# Patient Record
Sex: FEMALE | Race: WHITE | NOT HISPANIC OR LATINO | Employment: FULL TIME | RURAL
[De-identification: names, ages, dates, MRNs, and addresses within clinical notes are randomized per-mention and may not be internally consistent; named-entity substitution may affect disease eponyms.]

---

## 2021-11-01 ENCOUNTER — LAB VISIT (OUTPATIENT)
Dept: PRIMARY CARE CLINIC | Facility: CLINIC | Age: 66
End: 2021-11-01

## 2021-11-01 DIAGNOSIS — Z02.1 DRUG SCREENING, PRE-EMPLOYMENT: Primary | ICD-10-CM

## 2021-11-01 PROCEDURE — 99000 SPECIMEN HANDLING OFFICE-LAB: CPT | Mod: ,,, | Performed by: NURSE PRACTITIONER

## 2021-11-01 PROCEDURE — 99000 PR URINE DRUG SCREEN COLLECTION: ICD-10-PCS | Mod: ,,, | Performed by: NURSE PRACTITIONER

## 2022-02-15 ENCOUNTER — LAB VISIT (OUTPATIENT)
Dept: PRIMARY CARE CLINIC | Facility: CLINIC | Age: 67
End: 2022-02-15

## 2022-02-15 DIAGNOSIS — Z02.83 ENCOUNTER FOR EMPLOYMENT-RELATED DRUG TESTING: ICD-10-CM

## 2022-02-15 PROCEDURE — 99000 SPECIMEN HANDLING OFFICE-LAB: CPT | Mod: ,,, | Performed by: NURSE PRACTITIONER

## 2022-02-15 PROCEDURE — 99000 PR SPECIMEN HANDLING,DR OFF->LAB: ICD-10-PCS | Mod: ,,, | Performed by: NURSE PRACTITIONER

## 2022-02-15 NOTE — PROGRESS NOTES
Subjective:       Patient ID: Breonna Hanley is a 66 y.o. female.    Chief Complaint: No chief complaint on file.    HPI  Review of Systems      Objective:      Physical Exam    Assessment:       Problem List Items Addressed This Visit    None     Visit Diagnoses     Encounter for employment-related drug testing              Plan:       Drug testing only

## 2022-03-22 ENCOUNTER — LAB VISIT (OUTPATIENT)
Dept: PRIMARY CARE CLINIC | Facility: CLINIC | Age: 67
End: 2022-03-22

## 2022-03-22 DIAGNOSIS — Z02.83 ENCOUNTER FOR EMPLOYMENT-RELATED DRUG TESTING: ICD-10-CM

## 2022-03-22 PROCEDURE — 99000 SPECIMEN HANDLING OFFICE-LAB: CPT | Mod: ,,, | Performed by: NURSE PRACTITIONER

## 2022-03-22 PROCEDURE — 99000 PR URINE DRUG SCREEN COLLECTION: ICD-10-PCS | Mod: ,,, | Performed by: NURSE PRACTITIONER

## 2022-03-22 NOTE — PROGRESS NOTES
Subjective:       Patient ID: Breonna Hanley is a 67 y.o. female.    Chief Complaint: No chief complaint on file.    HPI  Review of Systems      Objective:      Physical Exam    Assessment:       Problem List Items Addressed This Visit    None     Visit Diagnoses     Encounter for employment-related drug testing              Plan:       Drug testing only

## 2023-02-07 ENCOUNTER — LAB VISIT (OUTPATIENT)
Dept: PRIMARY CARE CLINIC | Facility: CLINIC | Age: 68
End: 2023-02-07
Payer: COMMERCIAL

## 2023-02-07 DIAGNOSIS — Z02.83 ENCOUNTER FOR DRUG SCREENING: Primary | ICD-10-CM

## 2023-02-07 PROCEDURE — 99000 PR URINE DRUG SCREEN COLLECTION: ICD-10-PCS | Mod: ,,, | Performed by: NURSE PRACTITIONER

## 2023-02-07 PROCEDURE — 99000 SPECIMEN HANDLING OFFICE-LAB: CPT | Mod: ,,, | Performed by: NURSE PRACTITIONER

## 2023-02-07 NOTE — PROGRESS NOTES
Subjective:       Patient ID: Breonna Hanley is a 67 y.o. female.    Chief Complaint: No chief complaint on file.    HPI  Review of Systems      Objective:      Physical Exam    Assessment:       Problem List Items Addressed This Visit    None  Visit Diagnoses       Encounter for drug screening    -  Primary            Plan:       See scanned documents in .

## 2023-02-28 ENCOUNTER — OFFICE VISIT (OUTPATIENT)
Dept: OBSTETRICS AND GYNECOLOGY | Facility: CLINIC | Age: 68
End: 2023-02-28
Payer: COMMERCIAL

## 2023-02-28 VITALS
DIASTOLIC BLOOD PRESSURE: 105 MMHG | RESPIRATION RATE: 16 BRPM | HEIGHT: 63 IN | BODY MASS INDEX: 32.89 KG/M2 | HEART RATE: 61 BPM | TEMPERATURE: 98 F | WEIGHT: 185.63 LBS | SYSTOLIC BLOOD PRESSURE: 174 MMHG

## 2023-02-28 DIAGNOSIS — E89.40 ASYMPTOMATIC POSTSURGICAL MENOPAUSE: ICD-10-CM

## 2023-02-28 DIAGNOSIS — N89.8 VAGINAL DISCHARGE: ICD-10-CM

## 2023-02-28 DIAGNOSIS — B37.31 YEAST VAGINITIS: ICD-10-CM

## 2023-02-28 DIAGNOSIS — N95.2 VAGINITIS, ATROPHIC: ICD-10-CM

## 2023-02-28 DIAGNOSIS — B96.89 BACTERIAL VAGINOSIS: ICD-10-CM

## 2023-02-28 DIAGNOSIS — Z01.419 ENCOUNTER FOR ANNUAL ROUTINE GYNECOLOGICAL EXAMINATION: Primary | ICD-10-CM

## 2023-02-28 DIAGNOSIS — N76.0 BACTERIAL VAGINOSIS: ICD-10-CM

## 2023-02-28 DIAGNOSIS — I10 HYPERTENSION, UNSPECIFIED TYPE: ICD-10-CM

## 2023-02-28 DIAGNOSIS — E55.9 VITAMIN D DEFICIENCY: ICD-10-CM

## 2023-02-28 PROCEDURE — 3008F BODY MASS INDEX DOCD: CPT | Mod: ,,, | Performed by: OBSTETRICS & GYNECOLOGY

## 2023-02-28 PROCEDURE — 3080F DIAST BP >= 90 MM HG: CPT | Mod: ,,, | Performed by: OBSTETRICS & GYNECOLOGY

## 2023-02-28 PROCEDURE — 3077F PR MOST RECENT SYSTOLIC BLOOD PRESSURE >= 140 MM HG: ICD-10-PCS | Mod: ,,, | Performed by: OBSTETRICS & GYNECOLOGY

## 2023-02-28 PROCEDURE — 1159F MED LIST DOCD IN RCRD: CPT | Mod: ,,, | Performed by: OBSTETRICS & GYNECOLOGY

## 2023-02-28 PROCEDURE — 88142 CYTOPATH C/V THIN LAYER: CPT | Mod: TC,GCY | Performed by: OBSTETRICS & GYNECOLOGY

## 2023-02-28 PROCEDURE — 87660 TRICHOMONAS VAGIN DIR PROBE: CPT | Mod: ,,, | Performed by: CLINICAL MEDICAL LABORATORY

## 2023-02-28 PROCEDURE — 1160F PR REVIEW ALL MEDS BY PRESCRIBER/CLIN PHARMACIST DOCUMENTED: ICD-10-PCS | Mod: ,,, | Performed by: OBSTETRICS & GYNECOLOGY

## 2023-02-28 PROCEDURE — 87480 CANDIDA DNA DIR PROBE: CPT | Mod: ,,, | Performed by: CLINICAL MEDICAL LABORATORY

## 2023-02-28 PROCEDURE — 81001 URINALYSIS: ICD-10-PCS | Mod: ,,, | Performed by: CLINICAL MEDICAL LABORATORY

## 2023-02-28 PROCEDURE — 99387 PR PREVENTIVE VISIT,NEW,65 & OVER: ICD-10-PCS | Mod: S$PBB,,, | Performed by: OBSTETRICS & GYNECOLOGY

## 2023-02-28 PROCEDURE — 87491 CHLMYD TRACH DNA AMP PROBE: CPT | Mod: ,,, | Performed by: CLINICAL MEDICAL LABORATORY

## 2023-02-28 PROCEDURE — 87491 CHLAMYDIA/GC, PCR (THINPREP): ICD-10-PCS | Mod: ,,, | Performed by: CLINICAL MEDICAL LABORATORY

## 2023-02-28 PROCEDURE — 87591 CHLAMYDIA/GC, PCR (THINPREP): ICD-10-PCS | Mod: ,,, | Performed by: CLINICAL MEDICAL LABORATORY

## 2023-02-28 PROCEDURE — 3008F PR BODY MASS INDEX (BMI) DOCUMENTED: ICD-10-PCS | Mod: ,,, | Performed by: OBSTETRICS & GYNECOLOGY

## 2023-02-28 PROCEDURE — 1160F RVW MEDS BY RX/DR IN RCRD: CPT | Mod: ,,, | Performed by: OBSTETRICS & GYNECOLOGY

## 2023-02-28 PROCEDURE — 3077F SYST BP >= 140 MM HG: CPT | Mod: ,,, | Performed by: OBSTETRICS & GYNECOLOGY

## 2023-02-28 PROCEDURE — 99387 INIT PM E/M NEW PAT 65+ YRS: CPT | Mod: S$PBB,,, | Performed by: OBSTETRICS & GYNECOLOGY

## 2023-02-28 PROCEDURE — 99214 OFFICE O/P EST MOD 30 MIN: CPT | Mod: PBBFAC | Performed by: OBSTETRICS & GYNECOLOGY

## 2023-02-28 PROCEDURE — 87624 HUMAN PAPILLOMAVIRUS (HPV): ICD-10-PCS | Mod: ,,, | Performed by: CLINICAL MEDICAL LABORATORY

## 2023-02-28 PROCEDURE — 87510 GARDNER VAG DNA DIR PROBE: CPT | Mod: ,,, | Performed by: CLINICAL MEDICAL LABORATORY

## 2023-02-28 PROCEDURE — 81001 URINALYSIS AUTO W/SCOPE: CPT | Mod: ,,, | Performed by: CLINICAL MEDICAL LABORATORY

## 2023-02-28 PROCEDURE — 87510 BACTERIAL VAGINOSIS: ICD-10-PCS | Mod: ,,, | Performed by: CLINICAL MEDICAL LABORATORY

## 2023-02-28 PROCEDURE — 87660 BACTERIAL VAGINOSIS: ICD-10-PCS | Mod: ,,, | Performed by: CLINICAL MEDICAL LABORATORY

## 2023-02-28 PROCEDURE — 3080F PR MOST RECENT DIASTOLIC BLOOD PRESSURE >= 90 MM HG: ICD-10-PCS | Mod: ,,, | Performed by: OBSTETRICS & GYNECOLOGY

## 2023-02-28 PROCEDURE — 87591 N.GONORRHOEAE DNA AMP PROB: CPT | Mod: ,,, | Performed by: CLINICAL MEDICAL LABORATORY

## 2023-02-28 PROCEDURE — 1159F PR MEDICATION LIST DOCUMENTED IN MEDICAL RECORD: ICD-10-PCS | Mod: ,,, | Performed by: OBSTETRICS & GYNECOLOGY

## 2023-02-28 PROCEDURE — 87624 HPV HI-RISK TYP POOLED RSLT: CPT | Mod: ,,, | Performed by: CLINICAL MEDICAL LABORATORY

## 2023-02-28 PROCEDURE — 87480 BACTERIAL VAGINOSIS: ICD-10-PCS | Mod: ,,, | Performed by: CLINICAL MEDICAL LABORATORY

## 2023-02-28 RX ORDER — FUROSEMIDE 20 MG/1
20-40 TABLET ORAL DAILY PRN
COMMUNITY
Start: 2023-01-30

## 2023-02-28 RX ORDER — PANTOPRAZOLE SODIUM 40 MG/1
40 TABLET, DELAYED RELEASE ORAL
COMMUNITY
Start: 2023-01-26

## 2023-02-28 RX ORDER — CLINDAMYCIN PHOSPHATE 10 MG/G
GEL TOPICAL
COMMUNITY
Start: 2023-01-13 | End: 2024-03-20

## 2023-02-28 RX ORDER — PSEUDOEPHEDRINE HCL 30 MG/1
30 TABLET, FILM COATED ORAL
COMMUNITY
Start: 2023-01-12 | End: 2024-03-20

## 2023-02-28 RX ORDER — SPIRONOLACTONE 50 MG/1
50 TABLET, FILM COATED ORAL 2 TIMES DAILY
COMMUNITY
Start: 2022-12-01

## 2023-02-28 RX ORDER — ESTRADIOL 1 MG/1
1 TABLET ORAL
COMMUNITY
Start: 2023-02-21 | End: 2023-02-28 | Stop reason: SDUPTHER

## 2023-02-28 RX ORDER — TRAMADOL HYDROCHLORIDE 50 MG/1
50-100 TABLET ORAL EVERY 6 HOURS PRN
COMMUNITY
Start: 2023-01-25 | End: 2024-03-20

## 2023-02-28 RX ORDER — METOPROLOL SUCCINATE 50 MG/1
50 TABLET, EXTENDED RELEASE ORAL
COMMUNITY
Start: 2023-01-26

## 2023-02-28 RX ORDER — FLUCONAZOLE 150 MG/1
TABLET ORAL
COMMUNITY
Start: 2023-02-07 | End: 2023-03-30

## 2023-02-28 RX ORDER — ESTRADIOL 1 MG/1
1 TABLET ORAL DAILY
Qty: 90 TABLET | Refills: 3 | Status: SHIPPED | OUTPATIENT
Start: 2023-02-28 | End: 2024-02-28

## 2023-02-28 NOTE — PATIENT INSTRUCTIONS
Dr. Jeff Loja thanks you for this annual exam visit at [facility].    Please remember to perform monthly breast self exams. If you are over 40 years of age, Dr. Loja recommends yearly mammograms. Please check with your insurance carrier for mammogram insurance coverage.    Colonoscopy indication:      Low risk family history: schedule after age 50 for initial evaluation by a GI specialist.    High risk family history: Schedule before age 50 for initial evaluation by a GI specialist. High risk family history includes history of colon cancer in an offspring, brother or sister or parent.    The Annual Exam or periodic exam may includes thin prep Pap smear and appropriate ancillary labs as allowed by your insurance coverage.The studies Dr. Jeff Loja ordered has been checked by our [employer] Electronic Medical Record software today.     Please use perscribed medications as directed.    Special considerations after this visit:  Screening labs; thin prep Pap; hemoccult screen was negative today; affirm study; renew estradiol; recommend monthly breast self-exam and yearly mammography; recommend bone density; recommend interval colonoscopy      Dr. Jeff Loja recommends vitamin D3 4000 units daily.      Please practice best food and exercise habits for your age.    Dr. Jeff Loja recommends avoidance of smoking and illicit medications or habits.    Please call or schedule for any change in your health.     Dr. Jeff Loja recommends yearly exams for good health maintenance even if you pap smear schedule (as allowed by your health insurance coverage)  does not allow yearly pap testing.    Dr. Jeff Loja    02/28/2023 4:49 PM

## 2023-02-28 NOTE — PROGRESS NOTES
"Beronna Hanley female  for   Chief Complaint   Patient presents with    Gynecologic Exam    Medication Refill     Estrace    Annual Exam      OB History          4    Para   2    Term                AB   2    Living             SAB        IAB        Ectopic        Multiple        Live Births                      PHI:  68-year-old  female presents for an annual exam.  She is had in the past in  in [place] a LAVH and BSO for uterine prolapse.  Patient is on estradiol hormone replacement therapy.  Her previous gynecologist was Dr. Cruz who has left gynecologic practice for hospitalist care.    She recently was treated for facial acne with the presumed tetracycline and she states she developed the yeast vaginitis.  She still is slightly symptomatic.  She was treated by her other physician who treated the acne with a presumed Diflucan therapy.    History reviewed. No pertinent past medical history.   Past Surgical History:   Procedure Laterality Date    HYSTERECTOMY, VAGINAL, LAPAROSCOPY-ASSISTED, WITH SALPINGO-OOPHORECTOY      TUBAL LIGATION        Review of patient's allergies indicates:   Allergen Reactions    Azithromycin Edema    Celecoxib Other (See Comments)     Facial dropping, mimicking of stroke symptoms    Penicillins Edema        ROS:Pertinent items are noted in HPI.    Physical exam:    BP (!) 174/105   Pulse 61   Temp 97.9 °F (36.6 °C)   Resp 16   Ht 5' 3" (1.6 m)   Wt 84.2 kg (185 lb 9.6 oz)   BMI 32.88 kg/m²      General Appearance: healthy, alert, no distress, smiling    HEENT: normal exam    Lymphatic: no palpable lymphadenopathy, no hepatosplenomegaly    Chest:         Breasts:No dimpling, nipple retraction or discharge. No masses or nodes., Normal to inspection, and Normal breast tissue bilaterally         Lungs: clear to auscultation bilaterally         Heart: regular rate and rhythm, S1, S2 normal, no murmur, click, rub or gallop    Abdomen:soft, non-tender, " without masses or organomegaly, normal bowel sounds, without guarding, without rebound, and moderately obese; hypogastric midline incision well-healed with no ventral incisional hernia; no abdominal bruit; no ascites    Pelvic:             Vulva:normal, normal female genitalia            Vagina:atrophic, well-supported vaginal vault with a slight malodor noted on speculum exam            Uterus: surgically absent uterus and cervix            Adnexae: surgically absent    Rectal:negative, stool guaiac negative    Extremity: normal, extremities warm, no clubbing, no cyanosis, no edema, non-tender; no CVA tenderness bilaterally; good back alignment with exaggerated anterior lordosis     Skin: normal exam        Assessment:   Problem List Items Addressed This Visit    None  Visit Diagnoses       Encounter for annual routine gynecological examination    -  Primary    Relevant Orders    Bacterial Vaginosis (Completed)    ThinPrep Pap Test    CBC Auto Differential (Completed)    Comprehensive Metabolic Panel (Completed)    Lipid Panel (Completed)    Vitamin D (Completed)    Urinalysis (Completed)    Urinalysis, Microscopic (Completed)    Asymptomatic postsurgical menopause        Relevant Orders    Bacterial Vaginosis (Completed)    ThinPrep Pap Test    CBC Auto Differential (Completed)    Comprehensive Metabolic Panel (Completed)    Lipid Panel (Completed)    Vitamin D (Completed)    Urinalysis (Completed)    Urinalysis, Microscopic (Completed)    Vaginal discharge        Malodor -suspect BV    Relevant Orders    Bacterial Vaginosis (Completed)    ThinPrep Pap Test    CBC Auto Differential (Completed)    Comprehensive Metabolic Panel (Completed)    Lipid Panel (Completed)    Vitamin D (Completed)    Urinalysis (Completed)    Urinalysis, Microscopic (Completed)    Vaginitis, atrophic        Relevant Orders    Bacterial Vaginosis (Completed)    ThinPrep Pap Test    CBC Auto Differential (Completed)    Comprehensive  Metabolic Panel (Completed)    Lipid Panel (Completed)    Vitamin D (Completed)    Urinalysis (Completed)    Urinalysis, Microscopic (Completed)    Vitamin D deficiency        Vitamin D 25-Hydroxy, Blood ng/mL 23.3 -slightly low        Relevant Orders    Bacterial Vaginosis (Completed)    ThinPrep Pap Test    CBC Auto Differential (Completed)    Comprehensive Metabolic Panel (Completed)    Lipid Panel (Completed)    Vitamin D (Completed)    Urinalysis (Completed)    Urinalysis, Microscopic (Completed)    Hypertension, unspecified type        Relevant Orders    Bacterial Vaginosis (Completed)    ThinPrep Pap Test    CBC Auto Differential (Completed)    Comprehensive Metabolic Panel (Completed)    Lipid Panel (Completed)    Vitamin D (Completed)    Urinalysis (Completed)    Urinalysis, Microscopic (Completed)    Bacterial vaginosis        Confirmed by affirm study yesterday    Yeast vaginitis        Confirmed by affirm study yesterday             Plan:  Screening labs; vitamin-D checked today; thin prep Pap; hemoccult screen was negative today; affirm study; renew estradiol; recommend monthly breast self-exam and yearly mammography; recommend bone density; recommend interval colonoscopy    Dr. Jeff Loja recommends vitamin D3 4000 units daily.    Addendum on 03/01/2023:  Vitamin-D level is 24 ng/mL.  This is slightly low.  At her visit yesterday she was advised use over-the-counter vitamin D3 at 4000 units daily.  Dr. Jeff Loja recommends checking vitamin-D total in approximately 4 months.    Addendum on 03/01/2023:    Affirm study is positive for both yeast as well as bacterial vaginosis.  The patient be sent to prescriptions-1 prescription for Flagyl as well as Diflucan therapy as the other prescription to treat the 2 conditions of vulvovaginitis.

## 2023-03-01 ENCOUNTER — PATIENT MESSAGE (OUTPATIENT)
Dept: OBSTETRICS AND GYNECOLOGY | Facility: CLINIC | Age: 68
End: 2023-03-01
Payer: COMMERCIAL

## 2023-03-01 LAB
BILIRUB UR QL STRIP: NEGATIVE
CANDIDA SPECIES: POSITIVE
CLARITY UR: CLEAR
COLOR UR: COLORLESS
GARDNERELLA: POSITIVE
GLUCOSE UR STRIP-MCNC: NORMAL MG/DL
KETONES UR STRIP-SCNC: NORMAL MG/DL
LEUKOCYTE ESTERASE UR QL STRIP: NEGATIVE
NITRITE UR QL STRIP: NEGATIVE
PH UR STRIP: 5 PH UNITS
PROT UR QL STRIP: NEGATIVE
RBC # UR STRIP: NEGATIVE /UL
RBC #/AREA URNS HPF: <1 /HPF
SP GR UR STRIP: 1.01
SQUAMOUS #/AREA URNS LPF: ABNORMAL /HPF
TRICHOMONAS: NEGATIVE
UROBILINOGEN UR STRIP-ACNC: NORMAL MG/DL
WBC #/AREA URNS HPF: 1 /HPF

## 2023-03-01 RX ORDER — METRONIDAZOLE 500 MG/1
500 TABLET ORAL 3 TIMES DAILY
Qty: 42 TABLET | Refills: 0 | Status: SHIPPED | OUTPATIENT
Start: 2023-03-01 | End: 2023-03-30 | Stop reason: SDUPTHER

## 2023-03-01 RX ORDER — FLUCONAZOLE 100 MG/1
100 TABLET ORAL DAILY
Qty: 10 TABLET | Refills: 0 | Status: SHIPPED | OUTPATIENT
Start: 2023-03-01 | End: 2023-03-11

## 2023-03-03 LAB
GH SERPL-MCNC: NORMAL NG/ML
INSULIN SERPL-ACNC: NORMAL U[IU]/ML
LAB AP CLINICAL INFORMATION: NORMAL
LAB AP GYN INTERPRETATION: NEGATIVE
LAB AP PAP DISCLAIMER COMMENTS: NORMAL
RENIN PLAS-CCNC: NORMAL NG/ML/H

## 2023-03-07 LAB
CHLAMYDIA BY PCR: NEGATIVE
HPV 16: NEGATIVE
HPV 18: NEGATIVE
HPV OTHER: NEGATIVE
N. GONORRHOEAE (GC) BY PCR: NEGATIVE

## 2023-03-13 ENCOUNTER — TELEPHONE (OUTPATIENT)
Dept: OBSTETRICS AND GYNECOLOGY | Facility: CLINIC | Age: 68
End: 2023-03-13
Payer: COMMERCIAL

## 2023-03-13 NOTE — TELEPHONE ENCOUNTER
Message from Jeff Loja MD   Call patient    Advise her that she has both yeast vaginitis and bacterial vaginosis and a prescription for both Flagyl and Diflucan was sent to her pharmacy at [facility] today.  Date of prescription was 03/01/2023.    Pt verified by name and d.o.b, clarified if pt received med sent to pharmacy via e-script, voiced yes. Return for test cure appt made.

## 2023-03-29 ENCOUNTER — OFFICE VISIT (OUTPATIENT)
Dept: OBSTETRICS AND GYNECOLOGY | Facility: CLINIC | Age: 68
End: 2023-03-29
Payer: COMMERCIAL

## 2023-03-29 VITALS
TEMPERATURE: 98 F | RESPIRATION RATE: 16 BRPM | SYSTOLIC BLOOD PRESSURE: 163 MMHG | DIASTOLIC BLOOD PRESSURE: 97 MMHG | HEART RATE: 71 BPM | HEIGHT: 63 IN | BODY MASS INDEX: 32.07 KG/M2 | WEIGHT: 181 LBS

## 2023-03-29 DIAGNOSIS — N76.0 VAGINITIS AND VULVOVAGINITIS: Primary | ICD-10-CM

## 2023-03-29 DIAGNOSIS — Z12.31 SCREENING MAMMOGRAM, ENCOUNTER FOR: ICD-10-CM

## 2023-03-29 DIAGNOSIS — N95.2 VAGINITIS, ATROPHIC: ICD-10-CM

## 2023-03-29 LAB
CANDIDA SPECIES: POSITIVE
GARDNERELLA: POSITIVE
TRICHOMONAS: NEGATIVE

## 2023-03-29 PROCEDURE — 87510 GARDNER VAG DNA DIR PROBE: CPT | Mod: ,,, | Performed by: CLINICAL MEDICAL LABORATORY

## 2023-03-29 PROCEDURE — 1159F MED LIST DOCD IN RCRD: CPT | Mod: ,,, | Performed by: OBSTETRICS & GYNECOLOGY

## 2023-03-29 PROCEDURE — 87480 BACTERIAL VAGINOSIS: ICD-10-PCS | Mod: ,,, | Performed by: CLINICAL MEDICAL LABORATORY

## 2023-03-29 PROCEDURE — 99214 OFFICE O/P EST MOD 30 MIN: CPT | Mod: S$PBB,,, | Performed by: OBSTETRICS & GYNECOLOGY

## 2023-03-29 PROCEDURE — 1159F PR MEDICATION LIST DOCUMENTED IN MEDICAL RECORD: ICD-10-PCS | Mod: ,,, | Performed by: OBSTETRICS & GYNECOLOGY

## 2023-03-29 PROCEDURE — 3080F DIAST BP >= 90 MM HG: CPT | Mod: ,,, | Performed by: OBSTETRICS & GYNECOLOGY

## 2023-03-29 PROCEDURE — 3080F PR MOST RECENT DIASTOLIC BLOOD PRESSURE >= 90 MM HG: ICD-10-PCS | Mod: ,,, | Performed by: OBSTETRICS & GYNECOLOGY

## 2023-03-29 PROCEDURE — 99214 PR OFFICE/OUTPT VISIT, EST, LEVL IV, 30-39 MIN: ICD-10-PCS | Mod: S$PBB,,, | Performed by: OBSTETRICS & GYNECOLOGY

## 2023-03-29 PROCEDURE — 87510 BACTERIAL VAGINOSIS: ICD-10-PCS | Mod: ,,, | Performed by: CLINICAL MEDICAL LABORATORY

## 2023-03-29 PROCEDURE — 87660 BACTERIAL VAGINOSIS: ICD-10-PCS | Mod: ,,, | Performed by: CLINICAL MEDICAL LABORATORY

## 2023-03-29 PROCEDURE — 99214 OFFICE O/P EST MOD 30 MIN: CPT | Mod: PBBFAC | Performed by: OBSTETRICS & GYNECOLOGY

## 2023-03-29 PROCEDURE — 3077F SYST BP >= 140 MM HG: CPT | Mod: ,,, | Performed by: OBSTETRICS & GYNECOLOGY

## 2023-03-29 PROCEDURE — 3008F PR BODY MASS INDEX (BMI) DOCUMENTED: ICD-10-PCS | Mod: ,,, | Performed by: OBSTETRICS & GYNECOLOGY

## 2023-03-29 PROCEDURE — 3077F PR MOST RECENT SYSTOLIC BLOOD PRESSURE >= 140 MM HG: ICD-10-PCS | Mod: ,,, | Performed by: OBSTETRICS & GYNECOLOGY

## 2023-03-29 PROCEDURE — 87480 CANDIDA DNA DIR PROBE: CPT | Mod: ,,, | Performed by: CLINICAL MEDICAL LABORATORY

## 2023-03-29 PROCEDURE — 87660 TRICHOMONAS VAGIN DIR PROBE: CPT | Mod: ,,, | Performed by: CLINICAL MEDICAL LABORATORY

## 2023-03-29 PROCEDURE — 3008F BODY MASS INDEX DOCD: CPT | Mod: ,,, | Performed by: OBSTETRICS & GYNECOLOGY

## 2023-03-29 RX ORDER — ESTRADIOL 0.1 MG/G
1 CREAM VAGINAL
Qty: 42.5 G | Refills: 1 | Status: SHIPPED | OUTPATIENT
Start: 2023-03-29 | End: 2024-03-20 | Stop reason: SDUPTHER

## 2023-03-29 NOTE — PATIENT INSTRUCTIONS
Dr. Jeff Loja thanks you for this office visit at [facility].    Diagnosis for this visit:   Problem List Items Addressed This Visit    None  Visit Diagnoses       Vaginitis and vulvovaginitis    -  Primary    Resolved due to yeast and BV clinically    Relevant Orders    Bacterial Vaginosis    Vaginitis, atrophic        Relevant Orders    Bacterial Vaginosis             The Plan:  Repeat affirm; obtain records from [facility] regards to DEXA scan; recommend initiation of estradiol vaginal cream once weekly for atrophic vaginitis; Dr. Jeff Loja will recommend a mammogram at the [facility].        Please practice best food and exercise habits for your age.    Dr. Jeff Loja recommends avoidance of smoking and illicit medications or habits.    Please call  or schedule for any change in your health.     Please keep the next scheduled appointment or call for any need to change the appointment.     Dr. Jeff Loja recommends yearly exams for good health maintenance.      Thank you    Dr. Jeff Loja  03/29/2023 11:48 AM

## 2023-03-29 NOTE — PROGRESS NOTES
"Breonna Hanley female  for   Chief Complaint   Patient presents with    Follow-up     Repeat affirm for BV and yeast and lab results          PHI:  68-year-old  4, para 2, AB2  female who is had an LAVH and BSO for uterine prolapse in the past returns for follow-up.  The patient was found have both yeast and bacterial vaginosis and she is been treated with Diflucan and also with Flagyl.  She was asymptomatic on initiation of therapy.  Subsequent she is had some reoccurrence slight irritation since completion of therapy.     Her labs were reviewed and felt to be within normal limits.  DEXA scan was performed at [facility] last week the results are not on the chart yet    History reviewed. No pertinent past medical history.   Past Surgical History:   Procedure Laterality Date    HYSTERECTOMY, VAGINAL, LAPAROSCOPY-ASSISTED, WITH SALPINGO-OOPHORECTOY      TUBAL LIGATION        Review of patient's allergies indicates:   Allergen Reactions    Azithromycin Edema    Celecoxib Other (See Comments)     Facial dropping, mimicking of stroke symptoms    Penicillins Edema        ROS:Pertinent items are noted in HPI.    Physical exam:    BP (!) 163/97   Pulse 71   Temp 97.8 °F (36.6 °C)   Resp 16   Ht 5' 3" (1.6 m)   Wt 82.1 kg (181 lb)   BMI 32.06 kg/m²      General Appearance: healthy, alert, no distress, smiling    Chest:           Lungs: clear to auscultation bilaterally           Heart: regular rate and rhythm, S1, S2 normal, no murmur, click, rub or gallop    Abdomen:  Soft and nontender  Pelvic:  Vulva unremarkable; speculum exam reveals well-supported vaginal vault with atrophy and no white discharge or odor.  Bladder base is nontender.      Extremity: normal    Skin: normal exam        Assessment:   Problem List Items Addressed This Visit    None  Visit Diagnoses       Vaginitis and vulvovaginitis    -  Primary    Resolved due to yeast and BV clinically    Vaginitis, " atrophic                 Plan:  Repeat affirm; obtain records from [facility] regards to DEXA scan; recommend initiation of estradiol vaginal cream once weekly for atrophic vaginitis; Dr. Jeff Loja will recommend a mammogram at the [facility].    Addendum on 03/30/2023:  Patient was found to have positive still for yeast and BV on affirm study.  This may be consistent with her complaint of a slight recurrence of vaginal irritation although the exam yesterday revealed no odor or obvious discharge.  In view of this findings the patient should be treated again with both Diflucan and Flagyl.  Her partner should be treated with Flagyl as well.  Her lab review indicates her chemistries are normal with a normal glucose.

## 2023-03-30 RX ORDER — FLUCONAZOLE 100 MG/1
100 TABLET ORAL DAILY
Qty: 30 TABLET | Refills: 2 | Status: SHIPPED | OUTPATIENT
Start: 2023-03-30 | End: 2023-04-09

## 2023-03-30 RX ORDER — METRONIDAZOLE 500 MG/1
500 TABLET ORAL 3 TIMES DAILY
Qty: 84 TABLET | Refills: 0 | Status: SHIPPED | OUTPATIENT
Start: 2023-03-30 | End: 2023-04-13

## 2023-04-04 ENCOUNTER — TELEPHONE (OUTPATIENT)
Dept: OBSTETRICS AND GYNECOLOGY | Facility: CLINIC | Age: 68
End: 2023-04-04
Payer: COMMERCIAL

## 2023-04-04 NOTE — TELEPHONE ENCOUNTER
Message from Jeff Loja MD     Call patient advise her her affirm study is again positive for yeast in bacterial vaginosis.  Dr. Jeff Loja sent a renewal prescription for Flagyl for both her and her sexual partner.  In addition she does need to have another course of Diflucan.  Her blood sugars were checked and she is not diabetic.    Pt verified by name and  Reviewed lab results and recommended treatment for both her and her partner. Clarification of all medications prescribed. Voiced understanding and agreement. Mrs. Hanley will contact our office once treatment has been completed.

## 2023-04-06 ENCOUNTER — PATIENT MESSAGE (OUTPATIENT)
Dept: PRIMARY CARE CLINIC | Facility: CLINIC | Age: 68
End: 2023-04-06
Payer: COMMERCIAL

## 2023-05-04 ENCOUNTER — PATIENT MESSAGE (OUTPATIENT)
Dept: PRIMARY CARE CLINIC | Facility: CLINIC | Age: 68
End: 2023-05-04
Payer: COMMERCIAL

## 2023-05-16 ENCOUNTER — HOSPITAL ENCOUNTER (OUTPATIENT)
Dept: RADIOLOGY | Facility: HOSPITAL | Age: 68
Discharge: HOME | End: 2023-05-16
Attending: OBSTETRICS & GYNECOLOGY
Payer: COMMERCIAL

## 2023-05-16 VITALS — HEIGHT: 63 IN | BODY MASS INDEX: 32.07 KG/M2 | WEIGHT: 181 LBS

## 2023-05-16 DIAGNOSIS — Z12.31 SCREENING MAMMOGRAM, ENCOUNTER FOR: ICD-10-CM

## 2023-05-16 PROCEDURE — 77067 SCR MAMMO BI INCL CAD: CPT | Mod: TC

## 2024-03-20 ENCOUNTER — OFFICE VISIT (OUTPATIENT)
Dept: OBSTETRICS AND GYNECOLOGY | Facility: CLINIC | Age: 69
End: 2024-03-20
Payer: COMMERCIAL

## 2024-03-20 VITALS
DIASTOLIC BLOOD PRESSURE: 83 MMHG | BODY MASS INDEX: 31.18 KG/M2 | WEIGHT: 176 LBS | SYSTOLIC BLOOD PRESSURE: 152 MMHG | HEART RATE: 69 BPM | HEIGHT: 63 IN

## 2024-03-20 DIAGNOSIS — N84.2 POLYP OF VAGINA: ICD-10-CM

## 2024-03-20 DIAGNOSIS — E89.40 ASYMPTOMATIC POSTSURGICAL MENOPAUSE: ICD-10-CM

## 2024-03-20 DIAGNOSIS — E55.9 VITAMIN D DEFICIENCY: ICD-10-CM

## 2024-03-20 DIAGNOSIS — K21.9 GASTROESOPHAGEAL REFLUX DISEASE, UNSPECIFIED WHETHER ESOPHAGITIS PRESENT: ICD-10-CM

## 2024-03-20 DIAGNOSIS — N95.2 VAGINITIS, ATROPHIC: Primary | ICD-10-CM

## 2024-03-20 DIAGNOSIS — Z01.419 ENCOUNTER FOR ANNUAL ROUTINE GYNECOLOGICAL EXAMINATION: ICD-10-CM

## 2024-03-20 DIAGNOSIS — I10 HYPERTENSION, UNSPECIFIED TYPE: ICD-10-CM

## 2024-03-20 LAB
BILIRUB UR QL STRIP: NEGATIVE
CLARITY UR: CLEAR
COLOR UR: COLORLESS
CTP QC/QA: YES
FECAL OCCULT BLOOD, POC: NEGATIVE
GLUCOSE UR STRIP-MCNC: NORMAL MG/DL
KETONES UR STRIP-SCNC: NEGATIVE MG/DL
LEUKOCYTE ESTERASE UR QL STRIP: NEGATIVE
MUCOUS, UA: ABNORMAL /LPF
NITRITE UR QL STRIP: NEGATIVE
PH UR STRIP: 6 PH UNITS
PROT UR QL STRIP: NEGATIVE
RBC # UR STRIP: NEGATIVE /UL
RBC #/AREA URNS HPF: 1 /HPF
SP GR UR STRIP: 1.01
SQUAMOUS #/AREA URNS LPF: ABNORMAL /HPF
UROBILINOGEN UR STRIP-ACNC: NORMAL MG/DL
WBC #/AREA URNS HPF: 1 /HPF
YEAST #/AREA URNS HPF: ABNORMAL /HPF

## 2024-03-20 PROCEDURE — 81001 URINALYSIS AUTO W/SCOPE: CPT | Mod: ,,, | Performed by: CLINICAL MEDICAL LABORATORY

## 2024-03-20 PROCEDURE — 82270 OCCULT BLOOD FECES: CPT | Mod: S$GLB,,, | Performed by: OBSTETRICS & GYNECOLOGY

## 2024-03-20 PROCEDURE — 1160F RVW MEDS BY RX/DR IN RCRD: CPT | Mod: S$GLB,,, | Performed by: OBSTETRICS & GYNECOLOGY

## 2024-03-20 PROCEDURE — 1159F MED LIST DOCD IN RCRD: CPT | Mod: S$GLB,,, | Performed by: OBSTETRICS & GYNECOLOGY

## 2024-03-20 PROCEDURE — 99213 OFFICE O/P EST LOW 20 MIN: CPT | Mod: PBBFAC | Performed by: OBSTETRICS & GYNECOLOGY

## 2024-03-20 PROCEDURE — 3008F BODY MASS INDEX DOCD: CPT | Mod: S$GLB,,, | Performed by: OBSTETRICS & GYNECOLOGY

## 2024-03-20 PROCEDURE — 3079F DIAST BP 80-89 MM HG: CPT | Mod: S$GLB,,, | Performed by: OBSTETRICS & GYNECOLOGY

## 2024-03-20 PROCEDURE — 3077F SYST BP >= 140 MM HG: CPT | Mod: S$GLB,,, | Performed by: OBSTETRICS & GYNECOLOGY

## 2024-03-20 PROCEDURE — 99459 PELVIC EXAMINATION: CPT | Mod: S$GLB,,, | Performed by: OBSTETRICS & GYNECOLOGY

## 2024-03-20 PROCEDURE — 99397 PER PM REEVAL EST PAT 65+ YR: CPT | Mod: S$GLB,,, | Performed by: OBSTETRICS & GYNECOLOGY

## 2024-03-20 RX ORDER — NEOMYCIN/POLYMYXIN B/PRAMOXINE 3.5-10K-1
CREAM (GRAM) TOPICAL
COMMUNITY

## 2024-03-20 RX ORDER — ESTRADIOL 0.1 MG/G
1 CREAM VAGINAL
Qty: 42.5 G | Refills: 1 | Status: SHIPPED | OUTPATIENT
Start: 2024-03-21 | End: 2025-03-21

## 2024-03-20 RX ORDER — MOMETASONE FUROATE 1 MG/G
1 CREAM TOPICAL
COMMUNITY
Start: 2023-12-04

## 2024-03-20 RX ORDER — ESTRADIOL 1 MG/1
1 TABLET ORAL DAILY
Qty: 90 TABLET | Refills: 3 | Status: SHIPPED | OUTPATIENT
Start: 2024-03-20 | End: 2025-03-20

## 2024-03-20 NOTE — PATIENT INSTRUCTIONS
Dr. Jeff Loja thanks you for this office visit at [facility].    Diagnosis for this visit:   Problem List Items Addressed This Visit          Cardiac/Vascular    HTN (hypertension)       GI    Acid reflux     Other Visit Diagnoses       Vaginitis, atrophic    -  Primary    Polyp of vagina        Encounter for annual routine gynecological examination        Asymptomatic postsurgical menopause        Vitamin D deficiency                 The Plan:No Pap smear; hemoccult screen was negative today; screening lab             Recommend monthly breast self-exam; recommend mammography; recommend interval colonoscopy             Patient was advised of the finding of a asymptomatic probably post surgical fibrosis healing polyp of the vagina slight to the left of midline about 3 cm from the vaginal introitus and of no consequence.               Doctor Freedom will renew estradiol vaginal cream to treat atrophic vaginitis; doctor Freedom also renew estradiol 1 mg daily since she has no mastodynia symptoms.            Doctor Freedom will attempt to obtain the medical reported of the DEXA bone scan that was performed in presumed April of 2023.            Return visit in 1 year's time.        Please practice best food and exercise habits for your age.    Dr. Jeff Loja recommends avoidance of smoking and illicit medications or habits.    Please call  or schedule for any change in your health.     Please keep the next scheduled appointment or call for any need to change the appointment.     Dr. Jeff Loja recommends yearly exams for good health maintenance.      Thank you    Dr. Jeff Loja  03/20/2024 2:37 PM

## 2024-03-20 NOTE — PROGRESS NOTES
Breonna Hanley female  for   Chief Complaint   Patient presents with    Annual Exam    Vaginal burning      OB History          4    Para   2    Term   2            AB   2    Living             SAB        IAB        Ectopic        Multiple        Live Births                      PHI:  69-year-old  4, para 2, AB2  female presents for an examination with a complaint of recurrent vaginal burning.  She has been treated in the past for atrophic vaginitis.    Patient's past gynecologic history is significant for an LAVH and BSO but patient relates further refine medical history indicates that she has had 1 ovary that was lost and reattached itself within the abdomen to her intestines and had a subsequent laparotomy to remove this attached ovary.    Patient denies urinary tract symptoms or overactive bladder/no nocturia.  She does complain of vaginal burning that is relieved with topical vaginal estrogen.  She is worried she has a yeast vaginitis.  She denies any hot flash symptomatology.  Patient currently is on estradiol 1 mg orally daily.    Her most recent mammogram was 2023 and was a BI-RADS 2.    Patient had in the recent past a bone density at [facility] -2023.    Patient does use vitamin D3 over-the-counter.  Patient does use 4000 units daily.  She states vitamin-D plus walking is made her feel much younger.        Past Medical History:   Diagnosis Date    Acid reflux     HTN (hypertension)       Past Surgical History:   Procedure Laterality Date    HYSTERECTOMY      HYSTERECTOMY, VAGINAL, LAPAROSCOPY-ASSISTED, WITH SALPINGO-OOPHORECTOY      OOPHORECTOMY      TUBAL LIGATION        Review of patient's allergies indicates:   Allergen Reactions    Azithromycin Edema    Celecoxib Other (See Comments)     Facial dropping, mimicking of stroke symptoms    Penicillins Edema        ROS:Pertinent items are noted in HPI.    Physical exam:This gyn  "related exam was chaperoned by a female medical assistant.      BP (!) 152/83 (BP Location: Left arm, Patient Position: Sitting)   Pulse 69   Ht 5' 3" (1.6 m)   Wt 79.8 kg (176 lb)   BMI 31.18 kg/m²      General Appearance: healthy, alert, no distress, smiling    HEENT: normal exam    Lymphatic: no palpable lymphadenopathy, no hepatosplenomegaly    Chest:         Breasts:No dimpling, nipple retraction or discharge. No masses or nodes., Normal to inspection, and Normal breast tissue bilaterally         Lungs: clear to auscultation bilaterally and normal percussion bilaterally         Heart: regular rate and rhythm, S1, S2 normal, no murmur, click, rub or gallop    Abdomen:soft, non-tender, without masses or organomegaly, normal bowel sounds, without guarding, without rebound, and hypogastric midline incision with no evidence of ventral incisional hernia; there is no ascites; there is no abdominal bruit on auscultation of the abdomen.    Pelvic:             Vulva:normal, normal female genitalia, Bartholin's, Urethra, [place]stone normal, no lesions, normal female hair distribution, no clitoral enlargement            Vagina:normal mucosa, no discharge, atrophic, no vaginal odor, well-supported vaginal vault with what appears be a secondary polypoid growth that was probably previously present and result of her hysterectomy surgery slightly to the left of the midline in the anterior vaginal compartment about 2 cm from the vaginal apex.  There is no evidence of cystocele, vault prolapse or rectocele.  There is no urethrocele noted.            Uterus: surgically absent uterus and cervix            Adnexae: surgically absent    Rectal:negative, stool guaiac negative    Extremity: normal, extremities warm, no clubbing, no cyanosis, no edema, non-tender    Skin: normal exam        Assessment:   Problem List Items Addressed This Visit          Cardiac/Vascular    HTN (hypertension)       GI    Acid reflux     Other Visit " Diagnoses       Vaginitis, atrophic    -  Primary    Polyp of vagina        Encounter for annual routine gynecological examination        Asymptomatic postsurgical menopause        Vitamin D deficiency                 Plan:  No Pap smear; hemoccult screen was negative today; screening lab             Recommend monthly breast self-exam; recommend mammography; recommend interval colonoscopy             Patient was advised of the finding of a asymptomatic probably post surgical fibrosis healing polyp of the vagina slight to the left of midline about 3 cm from the vaginal introitus and of no consequence.               Doctor Loja will renew estradiol vaginal cream to treat atrophic vaginitis; doctor Loja also renew estradiol 1 mg daily since she has no mastodynia symptoms.            Doctor Loja will attempt to obtain the medical reported of the DEXA bone scan that was performed in presumed April of 2023.            Return visit in 1 year's time.

## 2024-05-28 ENCOUNTER — TELEPHONE (OUTPATIENT)
Dept: OBSTETRICS AND GYNECOLOGY | Facility: CLINIC | Age: 69
End: 2024-05-28
Payer: COMMERCIAL

## 2024-05-28 NOTE — TELEPHONE ENCOUNTER
return called and schedule appt for pt. 09/09/24 at 10 . Pt is also aware they do walk ins but it may be a wait.Plan of care ongoing.

## 2024-05-28 NOTE — TELEPHONE ENCOUNTER
----- Message from Felicita Gleason sent at 5/28/2024  9:31 AM CDT -----  Pt was trying to sched mammo but she has a knot under her right arm/breast area and they told her she would need to be seen first. No appts available til June 24th. Call pt [phone].  Who Called: Breonna Hanley    Caller is requesting assistance/information from provider's office.    Symptoms (please be specific): Knot under right arm/breast area   How long has patient had these symptoms:    List of preferred pharmacies on file (remove unneeded): [unfilled]  If different, enter pharmacy into here including location and phone number:       Preferred Method of Contact: Phone Call  Patient's Preferred Phone Number on File: [phone]   Best Call Back Number, if different:  Additional Information:

## 2024-05-30 ENCOUNTER — HOSPITAL ENCOUNTER (OUTPATIENT)
Dept: RADIOLOGY | Facility: HOSPITAL | Age: 69
Discharge: HOME | End: 2024-05-30
Attending: OBSTETRICS & GYNECOLOGY
Payer: COMMERCIAL

## 2024-05-30 VITALS — BODY MASS INDEX: 30.11 KG/M2 | WEIGHT: 170 LBS

## 2024-05-30 DIAGNOSIS — Z12.31 OTHER SCREENING MAMMOGRAM: ICD-10-CM

## 2024-05-30 PROCEDURE — 77063 BREAST TOMOSYNTHESIS BI: CPT | Mod: TC

## 2024-07-29 ENCOUNTER — OFFICE VISIT (OUTPATIENT)
Dept: OBSTETRICS AND GYNECOLOGY | Facility: CLINIC | Age: 69
End: 2024-07-29
Payer: COMMERCIAL

## 2024-07-29 VITALS
RESPIRATION RATE: 18 BRPM | OXYGEN SATURATION: 99 % | DIASTOLIC BLOOD PRESSURE: 59 MMHG | HEIGHT: 63 IN | HEART RATE: 70 BPM | SYSTOLIC BLOOD PRESSURE: 146 MMHG | BODY MASS INDEX: 32.11 KG/M2 | TEMPERATURE: 99 F | WEIGHT: 181.19 LBS

## 2024-07-29 DIAGNOSIS — N95.2 VAGINITIS, ATROPHIC: ICD-10-CM

## 2024-07-29 DIAGNOSIS — K21.9 GASTROESOPHAGEAL REFLUX DISEASE, UNSPECIFIED WHETHER ESOPHAGITIS PRESENT: ICD-10-CM

## 2024-07-29 DIAGNOSIS — R10.32 LEFT LOWER QUADRANT PAIN: Primary | ICD-10-CM

## 2024-07-29 DIAGNOSIS — K57.92 DIVERTICULITIS: ICD-10-CM

## 2024-07-29 DIAGNOSIS — I10 HYPERTENSION, UNSPECIFIED TYPE: ICD-10-CM

## 2024-07-29 LAB
BILIRUB UR QL STRIP: NEGATIVE
CLARITY UR: CLEAR
COLOR UR: NORMAL
CTP QC/QA: YES
FECAL OCCULT BLOOD, POC: NEGATIVE
GLUCOSE UR STRIP-MCNC: NORMAL MG/DL
KETONES UR STRIP-SCNC: NEGATIVE MG/DL
LEUKOCYTE ESTERASE UR QL STRIP: NEGATIVE
NITRITE UR QL STRIP: NEGATIVE
PH UR STRIP: 6 PH UNITS
PROT UR QL STRIP: NEGATIVE
RBC # UR STRIP: NEGATIVE /UL
RBC #/AREA URNS HPF: <1 /HPF
SP GR UR STRIP: 1.02
SQUAMOUS #/AREA URNS LPF: ABNORMAL /HPF
UROBILINOGEN UR STRIP-ACNC: NORMAL MG/DL
WBC #/AREA URNS HPF: 1 /HPF

## 2024-07-29 PROCEDURE — 3077F SYST BP >= 140 MM HG: CPT | Mod: S$GLB,,, | Performed by: OBSTETRICS & GYNECOLOGY

## 2024-07-29 PROCEDURE — 1160F RVW MEDS BY RX/DR IN RCRD: CPT | Mod: S$GLB,,, | Performed by: OBSTETRICS & GYNECOLOGY

## 2024-07-29 PROCEDURE — 82270 OCCULT BLOOD FECES: CPT | Mod: S$GLB,,, | Performed by: OBSTETRICS & GYNECOLOGY

## 2024-07-29 PROCEDURE — 1125F AMNT PAIN NOTED PAIN PRSNT: CPT | Mod: S$GLB,,, | Performed by: OBSTETRICS & GYNECOLOGY

## 2024-07-29 PROCEDURE — 81001 URINALYSIS AUTO W/SCOPE: CPT | Mod: ,,, | Performed by: CLINICAL MEDICAL LABORATORY

## 2024-07-29 PROCEDURE — 1159F MED LIST DOCD IN RCRD: CPT | Mod: S$GLB,,, | Performed by: OBSTETRICS & GYNECOLOGY

## 2024-07-29 PROCEDURE — 3008F BODY MASS INDEX DOCD: CPT | Mod: S$GLB,,, | Performed by: OBSTETRICS & GYNECOLOGY

## 2024-07-29 PROCEDURE — 3078F DIAST BP <80 MM HG: CPT | Mod: S$GLB,,, | Performed by: OBSTETRICS & GYNECOLOGY

## 2024-07-29 PROCEDURE — 99459 PELVIC EXAMINATION: CPT | Mod: S$GLB,,, | Performed by: OBSTETRICS & GYNECOLOGY

## 2024-07-29 PROCEDURE — 3044F HG A1C LEVEL LT 7.0%: CPT | Mod: S$GLB,,, | Performed by: OBSTETRICS & GYNECOLOGY

## 2024-07-29 PROCEDURE — 99999 PR PBB SHADOW E&M-EST. PATIENT-LVL V: CPT | Mod: PBBFAC,,, | Performed by: OBSTETRICS & GYNECOLOGY

## 2024-07-29 PROCEDURE — 99214 OFFICE O/P EST MOD 30 MIN: CPT | Mod: 25,S$GLB,, | Performed by: OBSTETRICS & GYNECOLOGY

## 2024-07-29 PROCEDURE — 96372 THER/PROPH/DIAG INJ SC/IM: CPT | Mod: S$GLB,,, | Performed by: OBSTETRICS & GYNECOLOGY

## 2024-07-29 RX ORDER — DICLOFENAC POTASSIUM 50 MG/1
50 TABLET, FILM COATED ORAL 3 TIMES DAILY
Qty: 30 TABLET | Refills: 0 | Status: SHIPPED | OUTPATIENT
Start: 2024-07-29 | End: 2024-08-08

## 2024-07-29 RX ORDER — METRONIDAZOLE 500 MG/1
500 TABLET ORAL 3 TIMES DAILY
Qty: 42 TABLET | Refills: 0 | Status: SHIPPED | OUTPATIENT
Start: 2024-07-29 | End: 2024-08-12

## 2024-07-29 RX ORDER — KETOROLAC TROMETHAMINE 30 MG/ML
60 INJECTION, SOLUTION INTRAMUSCULAR; INTRAVENOUS
Status: COMPLETED | OUTPATIENT
Start: 2024-07-29 | End: 2024-07-29

## 2024-07-29 RX ADMIN — KETOROLAC TROMETHAMINE 60 MG: 30 INJECTION, SOLUTION INTRAMUSCULAR; INTRAVENOUS at 12:07

## 2024-07-29 NOTE — PATIENT INSTRUCTIONS
Dr. Jeff Loja thanks you for this office visit at [facility].    Diagnosis for this visit:   Problem List Items Addressed This Visit          Cardiac/Vascular    HTN (hypertension)       GI    Acid reflux     Other Visit Diagnoses       Left lower quadrant pain    -  Primary    Vaginitis, atrophic        Diverticulitis                 The Plan:Abdominal/pelvic ultrasound; sed rate; CBC; urinalysis and reflex urine culture; hemoccult screen was negative             Toradol 60 mg IM; Flagyl therapy for possible diverticulosis/diverticulitis; diclofenac potassium for pain relief              GI consult              Follow-up in 3 weeks          Please practice best food and exercise habits for your age.    Dr. Jeff Loja recommends avoidance of smoking and illicit medications or habits.    Please call  or schedule for any change in your health.     Please keep the next scheduled appointment or call for any need to change the appointment.     Dr. Jeff Loja recommends yearly exams for good health maintenance.      Thank you    Dr. Jeff Loja  07/29/2024 12:34 PM

## 2024-07-29 NOTE — PROGRESS NOTES
"Breonna Hanley female  for   Chief Complaint   Patient presents with    Flank Pain     Left side, does not recall any injury, no bruising, says its "where my left ovary would be"            PHI:  Patient is complaining for one-week of acute left lower quadrant pain rated at level 7/10.  Patient denies any abdominal injury or pelvic injury.  Patient has had in the distant past in [place] a vaginal hysterectomy-[facility] with BSO.  Previously had a tubal sterilization.    She denies any urinary tract symptomatology.  She denies hematuria.  She denies any rectal bleeding.  Patient denies any change in her bowel habits.        Past Medical History:   Diagnosis Date    Acid reflux     HTN (hypertension)       Past Surgical History:   Procedure Laterality Date    HYSTERECTOMY      HYSTERECTOMY, VAGINAL, LAPAROSCOPY-ASSISTED, WITH SALPINGO-OOPHORECTOY  2005    OOPHORECTOMY      TUBAL LIGATION        Review of patient's allergies indicates:   Allergen Reactions    Azithromycin Edema    Celecoxib Other (See Comments)     Facial dropping, mimicking of stroke symptoms    Penicillins Edema        ROS:Pertinent items are noted in HPI.    Physical exam:This gyn related exam was chaperoned by a female medical assistant.      BP (!) 146/59 (BP Location: Left arm, Patient Position: Sitting)   Pulse 70   Temp 98.6 °F (37 °C)   Resp 18   Ht 5' 3" (1.6 m)   Wt 82.2 kg (181 lb 3.2 oz)   SpO2 99%   BMI 32.10 kg/m²      General Appearance: healthy, alert, mild distress, smiling    Chest:           Lungs: clear to auscultation bilaterally           Heart: regular rate and rhythm, S1, S2 normal, no murmur, click, rub or gallop    Abdomen:  Bowel sounds normal; hypogastric midline incision without any evidence of incisional or ventral hernia.  Patient points to tenderness left lower quadrant but there is no defect in the region with deep palpation.  Mons pubis is shaved.    Pelvic:  Normal vulva; speculum exam is unremarkable; deep rectal " vaginal exam fails reveal any evidence of mass in the deep rectal pelvic exam fails produces her symptomatology.  Hemoccult screen is negative.  Bladder base is nontender.  There was no evidence of pelvic relaxation.    Extremity: normal, extremities warm, no clubbing, no cyanosis, no edema, non-tender; no CVA tenderness bilaterally with good back alignment    Skin: normal exam        Assessment:   Problem List Items Addressed This Visit          Cardiac/Vascular    HTN (hypertension)       GI    Acid reflux     Other Visit Diagnoses       Left lower quadrant pain    -  Primary    Vaginitis, atrophic        Diverticulitis                 Plan:  Abdominal/pelvic ultrasound; sed rate; CBC; urinalysis and reflex urine culture; hemoccult screen was negative             Toradol 60 mg IM; Flagyl therapy for possible diverticulosis/diverticulitis; diclofenac potassium for pain relief              GI consult              Follow-up in 3 weeks